# Patient Record
(demographics unavailable — no encounter records)

---

## 2025-02-28 NOTE — HISTORY OF PRESENT ILLNESS
[de-identified] : Patient is a 52-year-old male here for reevaluation of left knee.  Patient last had cortisone injection January 14 states that he has been feeling better still has mild pain that comes and goes randomly denies numbness or tingling, denies instability  Left knee exam: No effusion no ecchymosis no erythema mild joint line tenderness, good range of motion good strength no gross instability neurovascular intact calf soft nontender  Reviewed prior x-ray of bilateral knees 3 views for comparison: No acute fractures, subluxations, dislocations.  Moderate tricompartmental osteoarthritis left knee.  Advanced tricompartmental osteoarthritis right knee  At this point patient's pain is tolerated and symptoms are minimal will take Motrin/Tylenol as needed for pain continue home exercises.  Patient will follow-up in 6 to 8 weeks if still having pain or symptoms worsen otherwise as needed.  Patient understands agrees with plan.